# Patient Record
Sex: FEMALE | Race: WHITE | NOT HISPANIC OR LATINO | URBAN - NONMETROPOLITAN AREA
[De-identification: names, ages, dates, MRNs, and addresses within clinical notes are randomized per-mention and may not be internally consistent; named-entity substitution may affect disease eponyms.]

---

## 2020-11-18 ENCOUNTER — APPOINTMENT (OUTPATIENT)
Dept: URBAN - NONMETROPOLITAN AREA CLINIC 45 | Age: 35
Setting detail: DERMATOLOGY
End: 2020-11-20

## 2020-11-18 DIAGNOSIS — D18.0 HEMANGIOMA: ICD-10-CM

## 2020-11-18 DIAGNOSIS — L71.8 OTHER ROSACEA: ICD-10-CM

## 2020-11-18 DIAGNOSIS — L98.0 PYOGENIC GRANULOMA: ICD-10-CM

## 2020-11-18 PROBLEM — D18.01 HEMANGIOMA OF SKIN AND SUBCUTANEOUS TISSUE: Status: ACTIVE | Noted: 2020-11-18

## 2020-11-18 PROBLEM — D48.5 NEOPLASM OF UNCERTAIN BEHAVIOR OF SKIN: Status: ACTIVE | Noted: 2020-11-18

## 2020-11-18 PROCEDURE — OTHER MIPS QUALITY: OTHER

## 2020-11-18 PROCEDURE — 99202 OFFICE O/P NEW SF 15 MIN: CPT | Mod: 25

## 2020-11-18 PROCEDURE — 11102 TANGNTL BX SKIN SINGLE LES: CPT

## 2020-11-18 PROCEDURE — OTHER COUNSELING: OTHER

## 2020-11-18 PROCEDURE — OTHER REASSURANCE: OTHER

## 2020-11-18 PROCEDURE — OTHER TREATMENT REGIMEN: OTHER

## 2020-11-18 PROCEDURE — OTHER BIOPSY BY SHAVE METHOD: OTHER

## 2020-11-18 ASSESSMENT — LOCATION ZONE DERM: LOCATION ZONE: FACE

## 2020-11-18 ASSESSMENT — LOCATION DETAILED DESCRIPTION DERM
LOCATION DETAILED: RIGHT INFERIOR CENTRAL MALAR CHEEK
LOCATION DETAILED: LEFT INFERIOR CENTRAL MALAR CHEEK
LOCATION DETAILED: LEFT INFERIOR MEDIAL MALAR CHEEK
LOCATION DETAILED: SUPERIOR MID FOREHEAD

## 2020-11-18 ASSESSMENT — LOCATION SIMPLE DESCRIPTION DERM
LOCATION SIMPLE: RIGHT CHEEK
LOCATION SIMPLE: SUPERIOR FOREHEAD
LOCATION SIMPLE: LEFT CHEEK

## 2020-11-18 NOTE — PROCEDURE: BIOPSY BY SHAVE METHOD
Hide Anesthesia Volume?: No
Additional Anesthesia Volume In Cc (Will Not Render If 0): 0
Electrodesiccation And Curettage Text: The wound bed was treated with electrodesiccation and curettage after the biopsy was performed.
Consent: Written consent was obtained and risks were reviewed including but not limited to scarring, infection, bleeding, scabbing, incomplete removal, nerve damage and allergy to anesthesia.
Cryotherapy Text: The wound bed was treated with cryotherapy after the biopsy was performed.
Dressing: pressure dressing with telfa
Anesthesia Type: 1% lidocaine with epinephrine and a 1:10 solution of 8.4% sodium bicarbonate
Size Of Lesion In Cm: 1.4
Post-Care Instructions: I reviewed with the patient in detail post-care instructions. Patient is to keep the biopsy site dry overnight, and then apply bacitracin twice daily until healed. Patient may apply hydrogen peroxide soaks to remove any crusting.
Type Of Destruction Used: Curettage
Wound Care: Polysporin ointment
Detail Level: Detailed
Billing Type: Third-Party Bill
Was A Bandage Applied: Yes
Electrodesiccation Text: The wound bed was treated with electrodesiccation after the biopsy was performed.
Information: Selecting Yes will display possible errors in your note based on the variables you have selected. This validation is only offered as a suggestion for you. PLEASE NOTE THAT THE VALIDATION TEXT WILL BE REMOVED WHEN YOU FINALIZE YOUR NOTE. IF YOU WANT TO FAX A PRELIMINARY NOTE YOU WILL NEED TO TOGGLE THIS TO 'NO' IF YOU DO NOT WANT IT IN YOUR FAXED NOTE.
Biopsy Method: Personna blade
Body Location Override (Optional - Billing Will Still Be Based On Selected Body Map Location If Applicable): right cheek
Hemostasis: Electrodesiccation
Anesthesia Volume In Cc (Will Not Render If 0): 2
Depth Of Biopsy: dermis
Notification Instructions: Patient will be notified of biopsy results. However, patient instructed to call the office if not contacted within 2 weeks.
Biopsy Type: H and E
Silver Nitrate Text: The wound bed was treated with silver nitrate after the biopsy was performed.

## 2020-11-18 NOTE — HPI: OTHER
Condition:: Redness
Please Describe Your Condition:: Located on the face. Patient states she has had mild redness for years.